# Patient Record
Sex: FEMALE | Employment: OTHER | URBAN - METROPOLITAN AREA
[De-identification: names, ages, dates, MRNs, and addresses within clinical notes are randomized per-mention and may not be internally consistent; named-entity substitution may affect disease eponyms.]

---

## 2024-02-22 ENCOUNTER — OFFICE VISIT (OUTPATIENT)
Dept: SPORTS MEDICINE | Facility: CLINIC | Age: 69
End: 2024-02-22
Payer: MEDICARE

## 2024-02-22 ENCOUNTER — HOSPITAL ENCOUNTER (OUTPATIENT)
Dept: RADIOLOGY | Facility: HOSPITAL | Age: 69
Discharge: HOME | End: 2024-02-22
Attending: STUDENT IN AN ORGANIZED HEALTH CARE EDUCATION/TRAINING PROGRAM
Payer: MEDICARE

## 2024-02-22 ENCOUNTER — TELEPHONE (OUTPATIENT)
Dept: SPORTS MEDICINE | Facility: CLINIC | Age: 69
End: 2024-02-22
Payer: MEDICARE

## 2024-02-22 DIAGNOSIS — M25.531 RIGHT WRIST PAIN: ICD-10-CM

## 2024-02-22 DIAGNOSIS — M79.645 BILATERAL THUMB PAIN: ICD-10-CM

## 2024-02-22 DIAGNOSIS — M79.644 BILATERAL THUMB PAIN: Primary | ICD-10-CM

## 2024-02-22 DIAGNOSIS — M79.644 BILATERAL THUMB PAIN: ICD-10-CM

## 2024-02-22 DIAGNOSIS — M79.644 CHRONIC PAIN OF RIGHT THUMB: ICD-10-CM

## 2024-02-22 DIAGNOSIS — M18.11 PRIMARY OSTEOARTHRITIS OF FIRST CARPOMETACARPAL JOINT OF RIGHT HAND: Primary | ICD-10-CM

## 2024-02-22 DIAGNOSIS — G89.29 CHRONIC PAIN OF RIGHT THUMB: ICD-10-CM

## 2024-02-22 DIAGNOSIS — M79.645 BILATERAL THUMB PAIN: Primary | ICD-10-CM

## 2024-02-22 PROCEDURE — 1125F AMNT PAIN NOTED PAIN PRSNT: CPT | Mod: CPTII,S$GLB,, | Performed by: STUDENT IN AN ORGANIZED HEALTH CARE EDUCATION/TRAINING PROGRAM

## 2024-02-22 PROCEDURE — 20604 DRAIN/INJ JOINT/BURSA W/US: CPT | Mod: RT,S$GLB,, | Performed by: STUDENT IN AN ORGANIZED HEALTH CARE EDUCATION/TRAINING PROGRAM

## 2024-02-22 PROCEDURE — 99203 OFFICE O/P NEW LOW 30 MIN: CPT | Mod: 25,S$GLB,, | Performed by: STUDENT IN AN ORGANIZED HEALTH CARE EDUCATION/TRAINING PROGRAM

## 2024-02-22 PROCEDURE — 73130 X-RAY EXAM OF HAND: CPT | Mod: TC,50

## 2024-02-22 PROCEDURE — 1159F MED LIST DOCD IN RCRD: CPT | Mod: CPTII,S$GLB,, | Performed by: STUDENT IN AN ORGANIZED HEALTH CARE EDUCATION/TRAINING PROGRAM

## 2024-02-22 PROCEDURE — 73130 X-RAY EXAM OF HAND: CPT | Mod: 26,50,, | Performed by: RADIOLOGY

## 2024-02-22 PROCEDURE — 99999 PR PBB SHADOW E&M-NEW PATIENT-LVL III: CPT | Mod: PBBFAC,,, | Performed by: STUDENT IN AN ORGANIZED HEALTH CARE EDUCATION/TRAINING PROGRAM

## 2024-02-22 PROCEDURE — 1160F RVW MEDS BY RX/DR IN RCRD: CPT | Mod: CPTII,S$GLB,, | Performed by: STUDENT IN AN ORGANIZED HEALTH CARE EDUCATION/TRAINING PROGRAM

## 2024-02-22 RX ORDER — BETAMETHASONE SODIUM PHOSPHATE AND BETAMETHASONE ACETATE 3; 3 MG/ML; MG/ML
6 INJECTION, SUSPENSION INTRA-ARTICULAR; INTRALESIONAL; INTRAMUSCULAR; SOFT TISSUE
Status: DISCONTINUED | OUTPATIENT
Start: 2024-02-22 | End: 2024-02-22 | Stop reason: HOSPADM

## 2024-02-22 RX ADMIN — BETAMETHASONE SODIUM PHOSPHATE AND BETAMETHASONE ACETATE 6 MG: 3; 3 INJECTION, SUSPENSION INTRA-ARTICULAR; INTRALESIONAL; INTRAMUSCULAR; SOFT TISSUE at 03:02

## 2024-02-22 NOTE — PATIENT INSTRUCTIONS
Assessment:  Aleyda Will is a 68 y.o. female with a chief complaint of Pain of the Right Hand    Encounter Diagnoses   Name Primary?    Right wrist pain     Primary osteoarthritis of first carpometacarpal joint of right hand Yes    Chronic pain of right thumb       Plan:  XR reviewed - degenerative changes of hand and wrist, especially at the CMC joint  We have reviewed the natural history of this disorder and discussed the diagnosis, treatment options, and prognosis in detail.  NSAIDs deferred per patient preference  US guided CSI performed today to right thumb CMC joint  Proper protocols after the injection included: no submerging pools, baths tubs, or hot tubs for 24 hr.  Showering is okay today.  Side effects of the corticosteroid injection can include elevated blood glucose levels and blood pressures, so if you are taking medications for these, please monitor closely, and contact your PCP if any issues.  Red flag symptoms include fever, chills, nausea, vomiting, red, warm, tender joint at the area of injection.  If you are noticing these symptoms, they may be indicative of an infection, and please seek medical care immediately, either by calling our clinic or going to the emergency room.     Follow-up: PRN or sooner if there are any problems between now and then.    Thank you for choosing [facility] and Dr. Osmar Nelson for your orthopedic & sports medicine care. It is our goal to provide you with exceptional care that will help keep you healthy, active, and get you back in the game.    Please do not hesitate to reach out to us via email, phone, or MyChart with any questions, concerns, or feedback.    If you are experiencing pain/discomfort ,or have questions after 5pm and would like to be connected to the [facility] on-call team, please call this number and specify which Sports Medicine provider is treating you: [phone]

## 2024-02-22 NOTE — PROGRESS NOTES
Patient ID: Aleyda Will  YOB: 1955  MRN: [id number]    Chief Complaint: Pain of the Right Hand    Referred By: Self    Occupation: [employer]     History of Present Illness: Aleyda Will is a right-hand dominant 68 y.o. female who presents today with Pain of the Right Hand    She complains of chronic right basal thumb joint pain that has bothered her for 3 years.  She has been told she has arthritis and has been treated in the past with injections in the CMC and MCP joints which have been helpful.  She has a hand based thumb spica brace that she uses but she is unable to use this when cooking.  She would like to receive a cortisone injection today.  No parasthesias or mechanical catching.    Past Medical History:   History reviewed. No pertinent past medical history.  Past Surgical History:   Procedure Laterality Date    BLADDER SURGERY      HYSTERECTOMY      TONSILLECTOMY      TUBAL LIGATION       History reviewed. No pertinent family history.  Social History     Socioeconomic History    Marital status:    Tobacco Use    Smoking status: Never    Smokeless tobacco: Never   Substance and Sexual Activity    Alcohol use: Not Currently    Drug use: Never     Medication List with Changes/Refills   Current Medications    CETIRIZINE (ZYRTEC) 10 MG TABLET    Take 10 mg by mouth once daily.     Review of patient's allergies indicates:   Allergen Reactions    Clindamycin phosphate Other (See Comments)     Sick--- causes C- diff in patient    Sulfamethoxazole-trimethoprim Other (See Comments)     Sick --- Causes C-Diff in patient       Physical Exam:   There is no height or weight on file to calculate BMI.    Physical Exam  Detailed MSK exam:               Right Hand/Wrist Exam     Pain   Wrist - The patient exhibits pain of the CMC.    Swelling   Wrist - The patient is swollen on the CMC.    Comments:  +CMC grind             Imaging:  X-Ray Hand 3 View Bilateral  Narrative:  EXAMINATION:  XR HAND COMPLETE 3 VIEWS BILATERAL    CLINICAL HISTORY:  . Pain in right finger(s)    TECHNIQUE:  PA, lateral, and oblique views of both hands were performed.    COMPARISON:  None    FINDINGS:  No acute abnormality.  Bilateral OA changes, severe at the right 1st CMC joint.  Impression: As above    Electronically signed by: Godwin Spencer MD  Date:    02/22/2024  Time:    15:41      Relevant imaging results were reviewed and interpreted by me and per my read:  Severe degenerative changes of the right 1st CMC joint    This was discussed with the patient and / or family today.     Patient Instructions   Assessment:  Aleyda Will is a 68 y.o. female with a chief complaint of Pain of the Right Hand    Encounter Diagnoses   Name Primary?    Right wrist pain     Primary osteoarthritis of first carpometacarpal joint of right hand Yes    Chronic pain of right thumb       Plan:  XR reviewed - degenerative changes of hand and wrist, especially at the CMC joint  We have reviewed the natural history of this disorder and discussed the diagnosis, treatment options, and prognosis in detail.  NSAIDs deferred per patient preference  US guided CSI performed today to right thumb CMC joint  Proper protocols after the injection included: no submerging pools, baths tubs, or hot tubs for 24 hr.  Showering is okay today.  Side effects of the corticosteroid injection can include elevated blood glucose levels and blood pressures, so if you are taking medications for these, please monitor closely, and contact your PCP if any issues.  Red flag symptoms include fever, chills, nausea, vomiting, red, warm, tender joint at the area of injection.  If you are noticing these symptoms, they may be indicative of an infection, and please seek medical care immediately, either by calling our clinic or going to the emergency room.     Follow-up: PRN or sooner if there are any problems between now and then.    Thank you for choosing  [facility] and Dr. Osmar Nelson for your orthopedic & sports medicine care. It is our goal to provide you with exceptional care that will help keep you healthy, active, and get you back in the game.    Please do not hesitate to reach out to us via email, phone, or MyChart with any questions, concerns, or feedback.    If you are experiencing pain/discomfort ,or have questions after 5pm and would like to be connected to the [facility] on-call team, please call this number and specify which Sports Medicine provider is treating you: [phone]      A copy of today's visit note has been sent to the referring provider.           Osmar Nelson MD  Primary Care Sports Medicine    Disclaimer: This note was prepared using a voice recognition system and is likely to have sound alike errors within the text.

## 2024-02-22 NOTE — PROCEDURES
Small Joint Aspiration/Injection: R thumb CMC    Date/Time: 2/22/2024 3:40 PM    Performed by: Osmar Nelson MD  Authorized by: Osmar Nelson MD    Consent Done?:  Yes (Verbal)  Indications:  Arthritis and pain  Site marked: the procedure site was marked    Timeout: prior to procedure the correct patient, procedure, and site was verified    Local anesthesia used?: Yes    Anesthesia:  Local infiltration  Local anesthetic:  Lidocaine 1% without epinephrine and topical anesthetic  Anesthetic total (ml):  1    Location:  Thumb  Site:  R thumb CMC  Ultrasonic guidance for needle placement?: Yes    Needle size:  25 G  Approach:  Radial  Medications:  6 mg betamethasone acetate-betamethasone sodium phosphate 6 mg/mL  Patient tolerance:  Patient tolerated the procedure well with no immediate complications    Additional Comments: Ultrasound guidance was used for needle localization. Images were saved and stored for documentation. The appropriate structures were visualized. Dynamic visualization of the needle was continuous throughout the procedures and maintained good position.     We discussed the proper protocols after the injection such as no submerging pools, baths tubs, or hot tubs for 24 hr.  Showering is okay today.  We also discussed that blood sugars can be elevated after an injection and asked patient to properly check their sugars over the next few days and contact their PCP if there are any concerns.  We discussed red flags such as fevers, chills, red, warm, tender joint at the area of injection to please seek medical care immediately.

## 2025-02-11 NOTE — PROGRESS NOTES
"       Patient ID: Aleyda Will  YOB: 1955  MRN: [id number]    Chief Complaint: Pain of the Right Hand (Thumb)    Referred By: Self    Occupation: Traveling [employer]     History of Present Illness: Aleyda Will is a right-hand dominant 69 y.o. female who presents today with Pain of the Right Hand (Thumb)    History of Present Illness    HPI:  Aleyda presents for follow-up, approximately one year since her last visit. She reports pain in her hand that started about two weeks ago, localized to a specific area she indicates. She expresses concern about the timing, stating that her life is about to become very busy with travel and cooking at different locations, and she needs to use her hand for chopping.    She has a history of ankle surgery following an injury. She explains that she broke her ankle, tibia, and fibula when she was running at [employer] and rounded a corner. She still has two screws in her ankle from this previous injury, which are "in the bone" and not causing issues.    She has received steroid injections in the past for her hand pain and is seeking another injection during this visit.    Aleyda denies any medical conditions that would contraindicate steroid injections, such as uncontrolled diabetes or uncontrolled high blood pressure. Aleyda received a corticosteroid injection about a year ago, which provided significant benefit for approximately one year.    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  She was diagnosed with right thumb CMC " OA and treated with right thumb CMC joint injection.    Past Medical History:   History reviewed. No pertinent past medical history.  Past Surgical History:   Procedure Laterality Date    BLADDER SURGERY      HYSTERECTOMY      TONSILLECTOMY      TUBAL LIGATION       No family history on file.  Social History     Socioeconomic History    Marital status:    Tobacco Use    Smoking status: Never    Smokeless tobacco: Never   Substance and Sexual Activity    Alcohol use: Not Currently    Drug use: Never     Social Drivers of Health     Financial Resource Strain: Low Risk  (2/12/2025)    Overall Financial Resource Strain (CARDIA)     Difficulty of Paying Living Expenses: Not hard at all   Food Insecurity: No Food Insecurity (2/12/2025)    Hunger Vital Sign     Worried About Running Out of Food in the Last Year: Never true     Ran Out of Food in the Last Year: Never true   Transportation Needs: No Transportation Needs (2/12/2025)    PRAPARE - Transportation     Lack of Transportation (Medical): No     Lack of Transportation (Non-Medical): No   Physical Activity: Insufficiently Active (2/12/2025)    Exercise Vital Sign     Days of Exercise per Week: 2 days     Minutes of Exercise per Session: 20 min   Stress: No Stress Concern Present (2/12/2025)    Citizen of [place] of Occupational Health - Occupational Stress Questionnaire     Feeling of Stress : Not at all   Housing Stability: Low Risk  (2/12/2025)    Housing Stability Vital Sign     Unable to Pay for Housing in the Last Year: No     Number of Times Moved in the Last Year: 0     Homeless in the Last Year: No     Medication List with Changes/Refills   Current Medications    ASPIRIN (ECOTRIN) 81 MG EC TABLET    Take 81 mg by mouth.    CETIRIZINE (ZYRTEC) 10 MG TABLET    Take 10 mg by mouth once daily.    HYDROCHLOROTHIAZIDE 12.5 MG TAB         Review of patient's allergies indicates:   Allergen Reactions    Clindamycin phosphate Other (See Comments)     Sick---  causes C- diff in patient    Sulfamethoxazole-trimethoprim Other (See Comments)     Sick --- Causes C-Diff in patient       Physical Exam:   Body mass index is 35.51 kg/m².    Detailed MSK exam:               Right Hand/Wrist Exam     Pain   Wrist - The patient exhibits pain of the CMC.    Swelling   Wrist - The patient is swollen on the CMC.    Comments:  +CMC grind           Imaging:    No new imaging today.     Relevant imaging results were reviewed and interpreted by me and per my read:  Severe degenerative changes of the right 1st CMC joint    This was discussed with the patient and / or family today.     Patient Instructions   Assessment:  Aleyda Will is a 69 y.o. female with a chief complaint of Pain of the Right Hand (Thumb)    Encounter Diagnoses   Name Primary?    Primary osteoarthritis of first carpometacarpal joint of right hand Yes    Chronic pain of right thumb       Plan:  Patient with recurrence of right CMC joint pain due to underlying degenerative osteoarthritis.  She has been responding very well with corticosteroid injections, with the last injection nearly 1 year ago, with upwards of 11 months of relief.  Recommend a repeat right CMC joint corticosteroid injection today.    Proper protocols after the injection included: no submerging pools, baths tubs, or hot tubs for 24 hr.  Showering is okay today.  Side effects of the corticosteroid injection can include elevated blood glucose levels and blood pressures, so if you are taking medications for these, please monitor closely, and contact your PCP if any issues.  Red flag symptoms include fever, chills, nausea, vomiting, red, warm, tender joint at the area of injection.  If you are noticing these symptoms, they may be indicative of an infection, and please seek medical care immediately, either by calling our clinic or going to the emergency room.  Patient prefers to avoid NSAID use.    Follow-up:  As needed or sooner if there are any problems  between now and then.    Thank you for choosing [facility] and Dr. Osmar Nelson for your orthopedic & sports medicine care. It is our goal to provide you with exceptional care that will help keep you healthy, active, and get you back in the game.    Please do not hesitate to reach out to us via email, phone, or MyChart with any questions, concerns, or feedback.    If you are experiencing pain/discomfort ,or have questions after 5pm and would like to be connected to the [facility] on-call team, please call this number and specify which Sports Medicine provider is treating you: [phone]       A copy of today's visit note has been sent to the referring provider.           Osmar Nelson MD  Primary Care Sports Medicine    Disclaimer: This note was prepared using a voice recognition system and is likely to have sound alike errors within the text.     This note was generated with the assistance of ambient listening technology. Verbal consent was obtained by the patient and accompanying visitor(s) for the recording of patient appointment to facilitate this note. I attest to having reviewed and edited the generated note for accuracy, though some syntax or spelling errors may persist. Please contact the author of this note for any clarification.

## 2025-02-12 ENCOUNTER — OFFICE VISIT (OUTPATIENT)
Dept: SPORTS MEDICINE | Facility: CLINIC | Age: 70
End: 2025-02-12
Payer: MEDICARE

## 2025-02-12 VITALS — BODY MASS INDEX: 35.36 KG/M2 | HEIGHT: 66 IN | WEIGHT: 220 LBS

## 2025-02-12 DIAGNOSIS — M79.644 CHRONIC PAIN OF RIGHT THUMB: ICD-10-CM

## 2025-02-12 DIAGNOSIS — G89.29 CHRONIC PAIN OF RIGHT THUMB: ICD-10-CM

## 2025-02-12 DIAGNOSIS — M18.11 PRIMARY OSTEOARTHRITIS OF FIRST CARPOMETACARPAL JOINT OF RIGHT HAND: Primary | ICD-10-CM

## 2025-02-12 PROCEDURE — 99999 PR PBB SHADOW E&M-EST. PATIENT-LVL III: CPT | Mod: PBBFAC,,, | Performed by: STUDENT IN AN ORGANIZED HEALTH CARE EDUCATION/TRAINING PROGRAM

## 2025-02-12 PROCEDURE — 99214 OFFICE O/P EST MOD 30 MIN: CPT | Mod: 25,S$GLB,, | Performed by: STUDENT IN AN ORGANIZED HEALTH CARE EDUCATION/TRAINING PROGRAM

## 2025-02-12 PROCEDURE — 1159F MED LIST DOCD IN RCRD: CPT | Mod: CPTII,S$GLB,, | Performed by: STUDENT IN AN ORGANIZED HEALTH CARE EDUCATION/TRAINING PROGRAM

## 2025-02-12 PROCEDURE — 1125F AMNT PAIN NOTED PAIN PRSNT: CPT | Mod: CPTII,S$GLB,, | Performed by: STUDENT IN AN ORGANIZED HEALTH CARE EDUCATION/TRAINING PROGRAM

## 2025-02-12 PROCEDURE — 3008F BODY MASS INDEX DOCD: CPT | Mod: CPTII,S$GLB,, | Performed by: STUDENT IN AN ORGANIZED HEALTH CARE EDUCATION/TRAINING PROGRAM

## 2025-02-12 PROCEDURE — 20604 DRAIN/INJ JOINT/BURSA W/US: CPT | Mod: RT,S$GLB,, | Performed by: STUDENT IN AN ORGANIZED HEALTH CARE EDUCATION/TRAINING PROGRAM

## 2025-02-12 PROCEDURE — 1160F RVW MEDS BY RX/DR IN RCRD: CPT | Mod: CPTII,S$GLB,, | Performed by: STUDENT IN AN ORGANIZED HEALTH CARE EDUCATION/TRAINING PROGRAM

## 2025-02-12 RX ORDER — ASPIRIN 81 MG/1
81 TABLET ORAL
COMMUNITY

## 2025-02-12 RX ORDER — BETAMETHASONE SODIUM PHOSPHATE AND BETAMETHASONE ACETATE 3; 3 MG/ML; MG/ML
6 INJECTION, SUSPENSION INTRA-ARTICULAR; INTRALESIONAL; INTRAMUSCULAR; SOFT TISSUE
Status: DISCONTINUED | OUTPATIENT
Start: 2025-02-12 | End: 2025-02-12 | Stop reason: HOSPADM

## 2025-02-12 RX ORDER — HYDROCHLOROTHIAZIDE 12.5 MG/1
TABLET ORAL
COMMUNITY
Start: 2024-07-03

## 2025-02-12 RX ADMIN — BETAMETHASONE SODIUM PHOSPHATE AND BETAMETHASONE ACETATE 6 MG: 3; 3 INJECTION, SUSPENSION INTRA-ARTICULAR; INTRALESIONAL; INTRAMUSCULAR; SOFT TISSUE at 01:02

## 2025-02-12 NOTE — PROCEDURES
Small Joint Aspiration/Injection: R thumb CMC    Date/Time: 2/12/2025 1:40 PM    Performed by: Osmar Nelson MD  Authorized by: Osmar Nelson MD    Consent Done?:  Yes (Verbal)  Indications:  Arthritis and pain  Site marked: the procedure site was marked    Timeout: prior to procedure the correct patient, procedure, and site was verified    Local anesthesia used?: Yes    Anesthesia:  Local infiltration  Local anesthetic:  Lidocaine 1% without epinephrine and topical anesthetic  Anesthetic total (ml):  1    Location:  Thumb  Site:  R thumb CMC  Ultrasonic guidance for needle placement?: Yes    Needle size:  25 G  Approach:  Radial  Medications:  6 mg betamethasone acetate-betamethasone sodium phosphate 6 mg/mL  Patient tolerance:  Patient tolerated the procedure well with no immediate complications    Additional Comments: Ultrasound guidance was used for needle localization. Images were saved and stored for documentation. The appropriate structures were visualized. Dynamic visualization of the needle was continuous throughout the procedures and maintained good position.     We discussed the proper protocols after the injection such as no submerging pools, baths tubs, or hot tubs for 24 hr.  Showering is okay today.  We also discussed that blood sugars can be elevated after an injection and asked patient to properly check their sugars over the next few days and contact their PCP if there are any concerns.  We discussed red flags such as fevers, chills, red, warm, tender joint at the area of injection to please seek medical care immediately.         normal...

## 2025-02-12 NOTE — PATIENT INSTRUCTIONS
Assessment:  Aleyda Will is a 69 y.o. female with a chief complaint of Pain of the Right Hand (Thumb)    Encounter Diagnoses   Name Primary?    Primary osteoarthritis of first carpometacarpal joint of right hand Yes    Chronic pain of right thumb       Plan:  Patient with recurrence of right CMC joint pain due to underlying degenerative osteoarthritis.  She has been responding very well with corticosteroid injections, with the last injection nearly 1 year ago, with upwards of 11 months of relief.  Recommend a repeat right CMC joint corticosteroid injection today.    Proper protocols after the injection included: no submerging pools, baths tubs, or hot tubs for 24 hr.  Showering is okay today.  Side effects of the corticosteroid injection can include elevated blood glucose levels and blood pressures, so if you are taking medications for these, please monitor closely, and contact your PCP if any issues.  Red flag symptoms include fever, chills, nausea, vomiting, red, warm, tender joint at the area of injection.  If you are noticing these symptoms, they may be indicative of an infection, and please seek medical care immediately, either by calling our clinic or going to the emergency room.  Patient prefers to avoid NSAID use.    Follow-up:  As needed or sooner if there are any problems between now and then.    Thank you for choosing [facility] and Dr. Osmar eNlson for your orthopedic & sports medicine care. It is our goal to provide you with exceptional care that will help keep you healthy, active, and get you back in the game.    Please do not hesitate to reach out to us via email, phone, or MyChart with any questions, concerns, or feedback.    If you are experiencing pain/discomfort ,or have questions after 5pm and would like to be connected to the [facility] on-call team, please call this number and specify which Sports Medicine provider is treating  you: [phone]